# Patient Record
Sex: FEMALE | Race: BLACK OR AFRICAN AMERICAN | NOT HISPANIC OR LATINO | Employment: OTHER | URBAN - METROPOLITAN AREA
[De-identification: names, ages, dates, MRNs, and addresses within clinical notes are randomized per-mention and may not be internally consistent; named-entity substitution may affect disease eponyms.]

---

## 2020-08-12 PROBLEM — I10 HYPERTENSION: Status: ACTIVE | Noted: 2020-08-12

## 2020-12-11 PROBLEM — R55 SYNCOPAL EPISODES: Status: ACTIVE | Noted: 2020-12-11

## 2020-12-17 PROBLEM — E11.9 TYPE 2 DIABETES MELLITUS WITHOUT COMPLICATION, WITHOUT LONG-TERM CURRENT USE OF INSULIN: Status: ACTIVE | Noted: 2020-12-17

## 2021-01-28 PROBLEM — R53.1 LEFT-SIDED WEAKNESS: Status: ACTIVE | Noted: 2021-01-28

## 2021-02-08 ENCOUNTER — CLINICAL SUPPORT (OUTPATIENT)
Dept: SMOKING CESSATION | Facility: CLINIC | Age: 51
End: 2021-02-08
Payer: COMMERCIAL

## 2021-02-08 VITALS — TEMPERATURE: 97 F

## 2021-02-08 DIAGNOSIS — F17.200 NICOTINE DEPENDENCE: Primary | ICD-10-CM

## 2021-02-08 PROCEDURE — 99404 PR PREVENT COUNSEL,INDIV,60 MIN: ICD-10-PCS | Mod: S$GLB,,, | Performed by: GENERAL PRACTICE

## 2021-02-08 PROCEDURE — 99404 PREV MED CNSL INDIV APPRX 60: CPT | Mod: S$GLB,,, | Performed by: GENERAL PRACTICE

## 2021-02-08 RX ORDER — VARENICLINE TARTRATE 0.5 (11)-1
KIT ORAL
Qty: 1 PACKAGE | Refills: 0 | Status: SHIPPED | OUTPATIENT
Start: 2021-02-08 | End: 2021-03-02

## 2021-02-15 PROBLEM — Z12.11 COLON CANCER SCREENING: Status: ACTIVE | Noted: 2021-02-15

## 2021-02-22 ENCOUNTER — TELEPHONE (OUTPATIENT)
Dept: SMOKING CESSATION | Facility: CLINIC | Age: 51
End: 2021-02-22

## 2021-02-23 ENCOUNTER — CLINICAL SUPPORT (OUTPATIENT)
Dept: SMOKING CESSATION | Facility: CLINIC | Age: 51
End: 2021-02-23
Payer: COMMERCIAL

## 2021-02-23 ENCOUNTER — TELEPHONE (OUTPATIENT)
Dept: SMOKING CESSATION | Facility: CLINIC | Age: 51
End: 2021-02-23

## 2021-02-23 DIAGNOSIS — F17.200 NICOTINE DEPENDENCE: Primary | ICD-10-CM

## 2021-02-23 PROCEDURE — 99402 PREV MED CNSL INDIV APPRX 30: CPT | Mod: S$GLB,,, | Performed by: GENERAL PRACTICE

## 2021-02-23 PROCEDURE — 99402 PR PREVENT COUNSEL,INDIV,30 MIN: ICD-10-PCS | Mod: S$GLB,,, | Performed by: GENERAL PRACTICE

## 2021-03-02 ENCOUNTER — CLINICAL SUPPORT (OUTPATIENT)
Dept: SMOKING CESSATION | Facility: CLINIC | Age: 51
End: 2021-03-02
Payer: COMMERCIAL

## 2021-03-02 DIAGNOSIS — F17.200 NICOTINE DEPENDENCE: Primary | ICD-10-CM

## 2021-03-02 PROCEDURE — 99402 PR PREVENT COUNSEL,INDIV,30 MIN: ICD-10-PCS | Mod: S$GLB,,, | Performed by: GENERAL PRACTICE

## 2021-03-02 PROCEDURE — 99402 PREV MED CNSL INDIV APPRX 30: CPT | Mod: S$GLB,,, | Performed by: GENERAL PRACTICE

## 2021-03-02 RX ORDER — VARENICLINE TARTRATE 1 MG/1
1 TABLET, FILM COATED ORAL 2 TIMES DAILY
Qty: 58 TABLET | Refills: 0 | Status: SHIPPED | OUTPATIENT
Start: 2021-03-02 | End: 2021-04-01 | Stop reason: SDUPTHER

## 2021-03-09 ENCOUNTER — CLINICAL SUPPORT (OUTPATIENT)
Dept: SMOKING CESSATION | Facility: CLINIC | Age: 51
End: 2021-03-09
Payer: COMMERCIAL

## 2021-03-09 DIAGNOSIS — F17.200 NICOTINE DEPENDENCE: Primary | ICD-10-CM

## 2021-03-09 PROCEDURE — 99402 PREV MED CNSL INDIV APPRX 30: CPT | Mod: S$GLB,,, | Performed by: GENERAL PRACTICE

## 2021-03-09 PROCEDURE — 99402 PR PREVENT COUNSEL,INDIV,30 MIN: ICD-10-PCS | Mod: S$GLB,,, | Performed by: GENERAL PRACTICE

## 2021-03-16 ENCOUNTER — CLINICAL SUPPORT (OUTPATIENT)
Dept: SMOKING CESSATION | Facility: CLINIC | Age: 51
End: 2021-03-16
Payer: COMMERCIAL

## 2021-03-16 ENCOUNTER — TELEPHONE (OUTPATIENT)
Dept: SMOKING CESSATION | Facility: CLINIC | Age: 51
End: 2021-03-16

## 2021-03-16 DIAGNOSIS — F17.200 NICOTINE DEPENDENCE: Primary | ICD-10-CM

## 2021-03-16 PROCEDURE — 99402 PR PREVENT COUNSEL,INDIV,30 MIN: ICD-10-PCS | Mod: S$GLB,,, | Performed by: GENERAL PRACTICE

## 2021-03-16 PROCEDURE — 99402 PREV MED CNSL INDIV APPRX 30: CPT | Mod: S$GLB,,, | Performed by: GENERAL PRACTICE

## 2021-03-22 ENCOUNTER — TELEPHONE (OUTPATIENT)
Dept: SMOKING CESSATION | Facility: CLINIC | Age: 51
End: 2021-03-22

## 2021-03-24 ENCOUNTER — TELEPHONE (OUTPATIENT)
Dept: SMOKING CESSATION | Facility: CLINIC | Age: 51
End: 2021-03-24

## 2021-03-29 DIAGNOSIS — F17.200 NICOTINE DEPENDENCE: ICD-10-CM

## 2021-03-29 RX ORDER — VARENICLINE TARTRATE 1 MG/1
TABLET, FILM COATED ORAL
Qty: 56 TABLET | Refills: 1 | OUTPATIENT
Start: 2021-03-29

## 2021-04-01 ENCOUNTER — TELEPHONE (OUTPATIENT)
Dept: SMOKING CESSATION | Facility: CLINIC | Age: 51
End: 2021-04-01

## 2021-04-01 ENCOUNTER — CLINICAL SUPPORT (OUTPATIENT)
Dept: SMOKING CESSATION | Facility: CLINIC | Age: 51
End: 2021-04-01
Payer: COMMERCIAL

## 2021-04-01 DIAGNOSIS — F17.200 NICOTINE DEPENDENCE: Primary | ICD-10-CM

## 2021-04-01 DIAGNOSIS — F17.200 NICOTINE DEPENDENCE: ICD-10-CM

## 2021-04-01 PROCEDURE — 99402 PR PREVENT COUNSEL,INDIV,30 MIN: ICD-10-PCS | Mod: S$GLB,,, | Performed by: GENERAL PRACTICE

## 2021-04-01 PROCEDURE — 99402 PREV MED CNSL INDIV APPRX 30: CPT | Mod: S$GLB,,, | Performed by: GENERAL PRACTICE

## 2021-04-01 RX ORDER — VARENICLINE TARTRATE 1 MG/1
1 TABLET, FILM COATED ORAL 2 TIMES DAILY
Qty: 58 TABLET | Refills: 0 | Status: SHIPPED | OUTPATIENT
Start: 2021-04-01 | End: 2021-05-18 | Stop reason: SDUPTHER

## 2021-04-15 ENCOUNTER — CLINICAL SUPPORT (OUTPATIENT)
Dept: SMOKING CESSATION | Facility: CLINIC | Age: 51
End: 2021-04-15
Payer: COMMERCIAL

## 2021-04-15 DIAGNOSIS — F17.200 NICOTINE DEPENDENCE: Primary | ICD-10-CM

## 2021-04-15 PROCEDURE — 99401 PR PREVENT COUNSEL,INDIV,15 MIN: ICD-10-PCS | Mod: S$GLB,,, | Performed by: GENERAL PRACTICE

## 2021-04-15 PROCEDURE — 99401 PREV MED CNSL INDIV APPRX 15: CPT | Mod: S$GLB,,, | Performed by: GENERAL PRACTICE

## 2021-04-27 ENCOUNTER — CLINICAL SUPPORT (OUTPATIENT)
Dept: SMOKING CESSATION | Facility: CLINIC | Age: 51
End: 2021-04-27
Payer: COMMERCIAL

## 2021-04-27 DIAGNOSIS — F17.200 NICOTINE DEPENDENCE: Primary | ICD-10-CM

## 2021-04-27 PROCEDURE — 99402 PR PREVENT COUNSEL,INDIV,30 MIN: ICD-10-PCS | Mod: S$GLB,,, | Performed by: GENERAL PRACTICE

## 2021-04-27 PROCEDURE — 99402 PREV MED CNSL INDIV APPRX 30: CPT | Mod: S$GLB,,, | Performed by: GENERAL PRACTICE

## 2021-04-27 RX ORDER — MICONAZOLE NITRATE 2 %
2 CREAM (GRAM) TOPICAL
Qty: 120 EACH | Refills: 0 | Status: SHIPPED | OUTPATIENT
Start: 2021-04-27 | End: 2021-07-19

## 2021-04-29 PROBLEM — Z86.73 HISTORY OF ARTERIAL ISCHEMIC STROKE: Status: ACTIVE | Noted: 2021-04-29

## 2021-04-29 PROBLEM — R48.1: Status: ACTIVE | Noted: 2021-04-29

## 2021-04-29 PROBLEM — F80.9 LANGUAGE DIFFICULTY: Status: ACTIVE | Noted: 2021-04-29

## 2021-05-04 ENCOUNTER — CLINICAL SUPPORT (OUTPATIENT)
Dept: SMOKING CESSATION | Facility: CLINIC | Age: 51
End: 2021-05-04
Payer: COMMERCIAL

## 2021-05-04 DIAGNOSIS — F17.200 NICOTINE DEPENDENCE: Primary | ICD-10-CM

## 2021-05-04 PROCEDURE — 99402 PR PREVENT COUNSEL,INDIV,30 MIN: ICD-10-PCS | Mod: S$GLB,,, | Performed by: GENERAL PRACTICE

## 2021-05-04 PROCEDURE — 99402 PREV MED CNSL INDIV APPRX 30: CPT | Mod: S$GLB,,, | Performed by: GENERAL PRACTICE

## 2021-05-18 ENCOUNTER — CLINICAL SUPPORT (OUTPATIENT)
Dept: SMOKING CESSATION | Facility: CLINIC | Age: 51
End: 2021-05-18
Payer: COMMERCIAL

## 2021-05-18 DIAGNOSIS — F17.200 NICOTINE DEPENDENCE: ICD-10-CM

## 2021-05-18 PROCEDURE — 90853 PR GROUP PSYCHOTHERAPY: ICD-10-PCS | Mod: S$GLB,,, | Performed by: GENERAL PRACTICE

## 2021-05-18 PROCEDURE — 90853 GROUP PSYCHOTHERAPY: CPT | Mod: S$GLB,,, | Performed by: GENERAL PRACTICE

## 2021-05-18 RX ORDER — VARENICLINE TARTRATE 1 MG/1
1 TABLET, FILM COATED ORAL 2 TIMES DAILY
Qty: 56 TABLET | Refills: 0 | Status: SHIPPED | OUTPATIENT
Start: 2021-05-18 | End: 2021-06-24 | Stop reason: SDUPTHER

## 2021-06-02 ENCOUNTER — CLINICAL SUPPORT (OUTPATIENT)
Dept: SMOKING CESSATION | Facility: CLINIC | Age: 51
End: 2021-06-02
Payer: COMMERCIAL

## 2021-06-02 DIAGNOSIS — F17.200 NICOTINE DEPENDENCE: Primary | ICD-10-CM

## 2021-06-02 PROCEDURE — 90853 GROUP PSYCHOTHERAPY: CPT | Mod: S$GLB,,, | Performed by: GENERAL PRACTICE

## 2021-06-02 PROCEDURE — 90853 PR GROUP PSYCHOTHERAPY: ICD-10-PCS | Mod: S$GLB,,, | Performed by: GENERAL PRACTICE

## 2021-06-21 ENCOUNTER — TELEPHONE (OUTPATIENT)
Dept: SMOKING CESSATION | Facility: CLINIC | Age: 51
End: 2021-06-21

## 2021-06-24 ENCOUNTER — CLINICAL SUPPORT (OUTPATIENT)
Dept: SMOKING CESSATION | Facility: CLINIC | Age: 51
End: 2021-06-24
Payer: COMMERCIAL

## 2021-06-24 ENCOUNTER — TELEPHONE (OUTPATIENT)
Dept: SMOKING CESSATION | Facility: CLINIC | Age: 51
End: 2021-06-24

## 2021-06-24 DIAGNOSIS — F17.200 NICOTINE DEPENDENCE: Primary | ICD-10-CM

## 2021-06-24 PROCEDURE — 99402 PREV MED CNSL INDIV APPRX 30: CPT | Mod: S$GLB,,, | Performed by: GENERAL PRACTICE

## 2021-06-24 PROCEDURE — 99402 PR PREVENT COUNSEL,INDIV,30 MIN: ICD-10-PCS | Mod: S$GLB,,, | Performed by: GENERAL PRACTICE

## 2021-06-24 RX ORDER — VARENICLINE TARTRATE 1 MG/1
1 TABLET, FILM COATED ORAL 2 TIMES DAILY
Qty: 56 TABLET | Refills: 0 | Status: SHIPPED | OUTPATIENT
Start: 2021-06-24 | End: 2021-07-26 | Stop reason: SDUPTHER

## 2021-07-19 ENCOUNTER — TELEPHONE (OUTPATIENT)
Dept: SMOKING CESSATION | Facility: CLINIC | Age: 51
End: 2021-07-19

## 2021-07-19 ENCOUNTER — CLINICAL SUPPORT (OUTPATIENT)
Dept: SMOKING CESSATION | Facility: CLINIC | Age: 51
End: 2021-07-19
Payer: COMMERCIAL

## 2021-07-19 DIAGNOSIS — F17.200 NICOTINE DEPENDENCE: Primary | ICD-10-CM

## 2021-07-19 PROCEDURE — 99402 PREV MED CNSL INDIV APPRX 30: CPT | Mod: S$GLB,,, | Performed by: GENERAL PRACTICE

## 2021-07-19 PROCEDURE — 99402 PR PREVENT COUNSEL,INDIV,30 MIN: ICD-10-PCS | Mod: S$GLB,,, | Performed by: GENERAL PRACTICE

## 2021-07-21 RX ORDER — IBUPROFEN 200 MG
1 TABLET ORAL DAILY
Qty: 14 PATCH | Refills: 0 | Status: SHIPPED | OUTPATIENT
Start: 2021-07-21 | End: 2021-07-30 | Stop reason: CLARIF

## 2021-07-26 ENCOUNTER — TELEPHONE (OUTPATIENT)
Dept: SMOKING CESSATION | Facility: CLINIC | Age: 51
End: 2021-07-26

## 2021-07-26 DIAGNOSIS — F17.200 NICOTINE DEPENDENCE: ICD-10-CM

## 2021-07-26 RX ORDER — VARENICLINE TARTRATE 1 MG/1
1 TABLET, FILM COATED ORAL 2 TIMES DAILY
Qty: 56 TABLET | Refills: 0 | Status: SHIPPED | OUTPATIENT
Start: 2021-07-26 | End: 2022-01-31 | Stop reason: ALTCHOICE

## 2021-07-29 ENCOUNTER — TELEPHONE (OUTPATIENT)
Dept: SMOKING CESSATION | Facility: CLINIC | Age: 51
End: 2021-07-29

## 2021-08-23 ENCOUNTER — TELEPHONE (OUTPATIENT)
Dept: SMOKING CESSATION | Facility: CLINIC | Age: 51
End: 2021-08-23

## 2021-09-08 ENCOUNTER — CLINICAL SUPPORT (OUTPATIENT)
Dept: SMOKING CESSATION | Facility: CLINIC | Age: 51
End: 2021-09-08
Payer: COMMERCIAL

## 2021-09-08 DIAGNOSIS — F17.200 NICOTINE DEPENDENCE: Primary | ICD-10-CM

## 2021-09-08 PROCEDURE — 99407 BEHAV CHNG SMOKING > 10 MIN: CPT | Mod: S$GLB,,,

## 2021-09-08 PROCEDURE — 99407 PR TOBACCO USE CESSATION INTENSIVE >10 MINUTES: ICD-10-PCS | Mod: S$GLB,,,

## 2021-09-28 ENCOUNTER — TELEPHONE (OUTPATIENT)
Dept: SMOKING CESSATION | Facility: CLINIC | Age: 51
End: 2021-09-28

## 2021-11-19 ENCOUNTER — PATIENT MESSAGE (OUTPATIENT)
Dept: ADMINISTRATIVE | Facility: OTHER | Age: 51
End: 2021-11-19

## 2021-11-19 PROBLEM — E78.00 PURE HYPERCHOLESTEROLEMIA: Status: ACTIVE | Noted: 2021-11-19

## 2021-12-17 PROCEDURE — 99454 PR REMOTE MNTR, PHYS PARAM, INITIAL, EA 30 DAYS: ICD-10-PCS | Mod: S$GLB,,, | Performed by: INTERNAL MEDICINE

## 2021-12-17 PROCEDURE — 99454 REM MNTR PHYSIOL PARAM 16-30: CPT | Mod: S$GLB,,, | Performed by: INTERNAL MEDICINE

## 2022-01-02 ENCOUNTER — PATIENT MESSAGE (OUTPATIENT)
Dept: ADMINISTRATIVE | Facility: OTHER | Age: 52
End: 2022-01-02

## 2022-01-19 DIAGNOSIS — U07.1 COVID-19 VIRUS DETECTED: ICD-10-CM

## 2022-01-29 ENCOUNTER — NURSE TRIAGE (OUTPATIENT)
Dept: ADMINISTRATIVE | Facility: CLINIC | Age: 52
End: 2022-01-29

## 2022-01-29 NOTE — TELEPHONE ENCOUNTER
"[facility] pt with c/o chest pain on right side x10 days, and difficulty breathing.    Pt was advised to go to ED now per triage protocol.  Pt verbalized understanding.     Reason for Disposition   [1] Chest pain (or "angina") comes and goes AND [2] is happening more often (increasing in frequency) or getting worse (increasing in severity) (Exception: chest pains that last only a few seconds)    Additional Information   Negative: SEVERE difficulty breathing (e.g., struggling for each breath, speaks in single words)   Negative: Difficult to awaken or acting confused (e.g., disoriented, slurred speech)   Negative: Shock suspected (e.g., cold/pale/clammy skin, too weak to stand, low BP, rapid pulse)   Negative: Passed out (i.e., fainted, collapsed and was not responding)   Negative: [1] Chest pain lasts > 5 minutes AND [2] age > 44   Negative: [1] Chest pain lasts > 5 minutes AND [2] age > 30 AND [3] one or more cardiac risk factors (e.g., diabetes, high blood pressure, high cholesterol, smoker, or strong family history of heart disease)   Negative: [1] Chest pain lasts > 5 minutes AND [2] history of heart disease (i.e., angina, heart attack, heart failure, bypass surgery, takes nitroglycerin)   Negative: [1] Chest pain lasts > 5 minutes AND [2] described as crushing, pressure-like, or heavy   Negative: Heart beating < 50 beats per minute OR > 140 beats per minute   Negative: Visible sweat on face or sweat dripping down face   Negative: Sounds like a life-threatening emergency to the triager   Negative: SEVERE chest pain    Protocols used: CHEST PAIN-A-AH      "

## 2022-02-16 ENCOUNTER — PATIENT MESSAGE (OUTPATIENT)
Dept: OTHER | Facility: OTHER | Age: 52
End: 2022-02-16

## 2022-03-15 ENCOUNTER — PATIENT MESSAGE (OUTPATIENT)
Dept: OTHER | Facility: OTHER | Age: 52
End: 2022-03-15

## 2022-05-12 ENCOUNTER — TELEPHONE (OUTPATIENT)
Dept: SMOKING CESSATION | Facility: CLINIC | Age: 52
End: 2022-05-12
Payer: COMMERCIAL

## 2022-05-16 ENCOUNTER — CLINICAL SUPPORT (OUTPATIENT)
Dept: SMOKING CESSATION | Facility: CLINIC | Age: 52
End: 2022-05-16
Payer: COMMERCIAL

## 2022-05-16 DIAGNOSIS — F17.200 NICOTINE DEPENDENCE: Primary | ICD-10-CM

## 2022-05-16 PROCEDURE — 99404 PREV MED CNSL INDIV APPRX 60: CPT | Mod: S$GLB,,, | Performed by: GENERAL PRACTICE

## 2022-05-16 PROCEDURE — 99404 PR PREVENT COUNSEL,INDIV,60 MIN: ICD-10-PCS | Mod: S$GLB,,, | Performed by: GENERAL PRACTICE

## 2022-05-16 RX ORDER — DM/P-EPHED/ACETAMINOPH/DOXYLAM 30-7.5/3
2 LIQUID (ML) ORAL
Qty: 72 LOZENGE | Refills: 0 | Status: SHIPPED | OUTPATIENT
Start: 2022-05-16 | End: 2022-10-10 | Stop reason: ALTCHOICE

## 2022-05-16 RX ORDER — IBUPROFEN 200 MG
1 TABLET ORAL DAILY
Qty: 14 PATCH | Refills: 0 | Status: SHIPPED | OUTPATIENT
Start: 2022-05-16 | End: 2022-10-10 | Stop reason: ALTCHOICE

## 2022-05-16 NOTE — Clinical Note
Met with patient in clinic to conduct intake appointment. Patient will be participating in weekly tobacco cessation meetings and will begin the prescribed tobacco cessation medication 14 mg patches and 2mg lozenges (PRN). FTND of 5 indicates a high level of dependence to tobacco. ROSALINA-D of 33 is perceived as no mental distress/ depression.

## 2022-05-31 ENCOUNTER — CLINICAL SUPPORT (OUTPATIENT)
Dept: SMOKING CESSATION | Facility: CLINIC | Age: 52
End: 2022-05-31
Payer: COMMERCIAL

## 2022-05-31 DIAGNOSIS — F17.200 NICOTINE DEPENDENCE: Primary | ICD-10-CM

## 2022-05-31 PROCEDURE — 99404 PR PREVENT COUNSEL,INDIV,60 MIN: ICD-10-PCS | Mod: S$GLB,,, | Performed by: GENERAL PRACTICE

## 2022-05-31 PROCEDURE — 99404 PREV MED CNSL INDIV APPRX 60: CPT | Mod: S$GLB,,, | Performed by: GENERAL PRACTICE

## 2022-05-31 NOTE — Clinical Note
Met with patient at length regarding tobacco cessation progress. Patient was prescribed 14mg nicotine patches and 2mg lozenges. She continues to smoke up to 10 cigarettes a day. Patient informed me that she has not started the process of quitting. She stated that she was not ready and had to get her mind ready. I told her that I understood. Patient plans to start the process on Sunday 6/5/22. I encouraged her to write down the benefits or quitting and the benefits of staying a smoker. We discussed learned addiction model, cues/triggers, personal reasons for quitting, medications, goals. The patient will continue with  therapy sessions and medication monitoring by CTTS. Prescribed medication management will be by physician. The patient denies any abnormal behavioral or mental changes at this time.

## 2022-05-31 NOTE — PROGRESS NOTES
Individual Follow-Up Form    5/31/2022    Quit Date: none    Clinical Status of Patient: Outpatient    Length of Service: 60 minutes    Continuing Medication: no    Other Medications: none     Target Symptoms: Withdrawal and medication side effects. The following were rated moderate (3) to severe (4) on TCRS:  · Moderate (3): none  · Severe (4): none    Comments: Met with patient at length regarding tobacco cessation progress. Patient was prescribed 14mg nicotine patches and 2mg lozenges. She continues to smoke up to 10 cigarettes a day. Patient informed me that she has not started the process of quitting. She stated that she was not ready and had to get her mind ready. I told her that I understood. Patient plans to start the process on Sunday 6/5/22. I encouraged her to write down the benefits or quitting and the benefits of staying a smoker. We discussed learned addiction model, cues/triggers, personal reasons for quitting, medications, goals. The patient will continue with  therapy sessions and medication monitoring by CTTS. Prescribed medication management will be by physician. The patient denies any abnormal behavioral or mental changes at this time.       Diagnosis: F17.200    Next Visit: 2 weeks

## 2022-06-14 ENCOUNTER — TELEPHONE (OUTPATIENT)
Dept: SMOKING CESSATION | Facility: CLINIC | Age: 52
End: 2022-06-14

## 2022-06-15 ENCOUNTER — PATIENT MESSAGE (OUTPATIENT)
Dept: OTHER | Facility: OTHER | Age: 52
End: 2022-06-15
Payer: COMMERCIAL

## 2022-06-16 ENCOUNTER — TELEPHONE (OUTPATIENT)
Dept: SMOKING CESSATION | Facility: CLINIC | Age: 52
End: 2022-06-16
Payer: COMMERCIAL

## 2022-07-05 ENCOUNTER — PATIENT MESSAGE (OUTPATIENT)
Dept: OTHER | Facility: OTHER | Age: 52
End: 2022-07-05
Payer: COMMERCIAL

## 2022-11-09 ENCOUNTER — CLINICAL SUPPORT (OUTPATIENT)
Dept: SMOKING CESSATION | Facility: CLINIC | Age: 52
End: 2022-11-09
Payer: COMMERCIAL

## 2022-11-09 DIAGNOSIS — F17.200 NICOTINE DEPENDENCE: Primary | ICD-10-CM

## 2022-11-09 PROCEDURE — 99999 PR PBB SHADOW E&M-EST. PATIENT-LVL I: ICD-10-PCS | Mod: PBBFAC,,,

## 2022-11-09 PROCEDURE — 99407 BEHAV CHNG SMOKING > 10 MIN: CPT | Mod: S$GLB,,, | Performed by: GENERAL PRACTICE

## 2022-11-09 PROCEDURE — 99407 PR TOBACCO USE CESSATION INTENSIVE >10 MINUTES: ICD-10-PCS | Mod: S$GLB,,, | Performed by: GENERAL PRACTICE

## 2022-11-09 PROCEDURE — 99999 PR PBB SHADOW E&M-EST. PATIENT-LVL I: CPT | Mod: PBBFAC,,,

## 2022-11-09 NOTE — PROGRESS NOTES
Spoke with patient today in regard to smoking cessation progress 3 and 6 month telephone follow up, she states that she is currently not tobacco free.  Patient stated that she became tobacco free for a short period of time but returned to smoking soon after.  Commended patient on the accomplishments thus far.  Informed patient of benefit period, future follow up, and contact information if any further help or support is needed.  Will complete smart form for 12 month follow up on quit attempt #1 and resolve and 3/6 month follow up on Quit attempt #2

## 2023-02-06 PROBLEM — I69.354 HEMIPARESIS AFFECTING LEFT SIDE AS LATE EFFECT OF CEREBROVASCULAR ACCIDENT (CVA): Status: ACTIVE | Noted: 2023-02-06

## 2023-05-08 PROBLEM — E11.9 TYPE 2 DIABETES MELLITUS WITHOUT COMPLICATION, WITHOUT LONG-TERM CURRENT USE OF INSULIN: Status: RESOLVED | Noted: 2020-12-17 | Resolved: 2023-05-08

## 2023-05-16 ENCOUNTER — CLINICAL SUPPORT (OUTPATIENT)
Dept: SMOKING CESSATION | Facility: CLINIC | Age: 53
End: 2023-05-16
Payer: COMMERCIAL

## 2023-05-16 DIAGNOSIS — F17.200 NICOTINE DEPENDENCE, UNCOMPLICATED: Primary | ICD-10-CM

## 2023-05-16 PROCEDURE — 99999 PR PBB SHADOW E&M-EST. PATIENT-LVL I: ICD-10-PCS | Mod: PBBFAC,,,

## 2023-05-16 PROCEDURE — 99999 PR PBB SHADOW E&M-EST. PATIENT-LVL I: CPT | Mod: PBBFAC,,,

## 2023-05-16 PROCEDURE — 99407 BEHAV CHNG SMOKING > 10 MIN: CPT | Mod: S$GLB,,, | Performed by: GENERAL PRACTICE

## 2023-05-16 PROCEDURE — 99407 PR TOBACCO USE CESSATION INTENSIVE >10 MINUTES: ICD-10-PCS | Mod: S$GLB,,, | Performed by: GENERAL PRACTICE

## 2023-05-16 NOTE — PROGRESS NOTES
Spoke with patient today in regard to smoking cessation progress for 12 month follow up. She states she is not tobacco free. Patient has scheduled appointment on 6/1/2023 @ 1400 to return to the program for Quit attempt # 3. Informed patient of benefit period, future follow ups and contact information if any further help is needed. Will resolve smart form for 12 month follow up for Quit # 2.

## 2023-06-01 ENCOUNTER — CLINICAL SUPPORT (OUTPATIENT)
Dept: SMOKING CESSATION | Facility: CLINIC | Age: 53
End: 2023-06-01
Payer: COMMERCIAL

## 2023-06-01 DIAGNOSIS — F17.200 NICOTINE DEPENDENCE: Primary | ICD-10-CM

## 2023-06-01 PROCEDURE — 99404 PR PREVENT COUNSEL,INDIV,60 MIN: ICD-10-PCS | Mod: S$GLB,,, | Performed by: GENERAL PRACTICE

## 2023-06-01 PROCEDURE — 99404 PREV MED CNSL INDIV APPRX 60: CPT | Mod: S$GLB,,, | Performed by: GENERAL PRACTICE

## 2023-06-01 RX ORDER — VARENICLINE TARTRATE 1 MG/1
1 TABLET, FILM COATED ORAL 2 TIMES DAILY
Qty: 56 TABLET | Refills: 1 | Status: SHIPPED | OUTPATIENT
Start: 2023-06-01 | End: 2023-09-18

## 2023-06-01 NOTE — Clinical Note
Met with patient in clinic to conduct Quit Attempt 3 intake appointment. Patient will be participating in weekly tobacco cessation meetings and will begin the prescribed tobacco cessation medication Chantix (Varenicline) 1mg twice a day. FTND of 5 indicates a high level of dependence to tobacco. ROSALINA-D of 5 is perceived as no mental distress/ depression.

## 2023-06-14 ENCOUNTER — PATIENT MESSAGE (OUTPATIENT)
Dept: ADMINISTRATIVE | Facility: OTHER | Age: 53
End: 2023-06-14
Payer: MEDICARE

## 2023-06-15 ENCOUNTER — CLINICAL SUPPORT (OUTPATIENT)
Dept: SMOKING CESSATION | Facility: CLINIC | Age: 53
End: 2023-06-15
Payer: COMMERCIAL

## 2023-06-15 DIAGNOSIS — F17.200 NICOTINE DEPENDENCE: Primary | ICD-10-CM

## 2023-06-15 PROCEDURE — 99402 PR PREVENT COUNSEL,INDIV,30 MIN: ICD-10-PCS | Mod: S$GLB,,, | Performed by: GENERAL PRACTICE

## 2023-06-15 PROCEDURE — 99402 PREV MED CNSL INDIV APPRX 30: CPT | Mod: S$GLB,,, | Performed by: GENERAL PRACTICE

## 2023-06-15 NOTE — PROGRESS NOTES
Individual Follow-Up Form    6/15/2023    Quit Date: TBD    Clinical Status of Patient: Outpatient    Length of Service: 30 minutes    Continuing Medication: yes  Chantix    Other Medications: none     Target Symptoms: Withdrawal and medication side effects. The following were rated moderate (3) to severe (4) on TCRS:  Moderate (3): nausea  Severe (4): none    Comments: Spoke with patient at length via phone regarding tobacco cessation progress. Patient was prescribed Chantix 1mg twice a day. She continues to smoke up to 10 cigarettes a day. Patient informed me that she has been getting nauseated in the late afternoon once taking Chantix. I advised her to take the Chantix tomorrow and go the day without smoking. If she does not get sick we would know its from smoking while taking the medication. We discussed learned addiction model, cues/triggers, personal reasons for quitting, medications, goals. The patient will continue with  therapy sessions and medication monitoring by CTTS. Prescribed medication management will be by physician. The patient denies any abnormal behavioral or mental changes at this time.     Diagnosis: F17.200    Next Visit: 2 weeks

## 2023-06-15 NOTE — Clinical Note
Spoke with patient at length via phone regarding tobacco cessation progress. Patient was prescribed Chantix 1mg twice a day. She continues to smoke up to 10 cigarettes a day. Patient informed me that she has been getting nauseated in the late afternoon once taking Chantix. I advised her to take the Chantix tomorrow and go the day without smoking. If she does not get sick we would know its from smoking while taking the medication. We discussed learned addiction model, cues/triggers, personal reasons for quitting, medications, goals. The patient will continue with  therapy sessions and medication monitoring by CTTS. Prescribed medication management will be by physician. The patient denies any abnormal behavioral or mental changes at this time.

## 2023-07-17 ENCOUNTER — TELEPHONE (OUTPATIENT)
Dept: SMOKING CESSATION | Facility: CLINIC | Age: 53
End: 2023-07-17
Payer: MEDICARE

## 2023-07-17 NOTE — TELEPHONE ENCOUNTER
Called for follow-up telephonic visit with Smoking Cessation clinic. Pt did not answer, left message to return call.

## 2023-09-05 ENCOUNTER — TELEPHONE (OUTPATIENT)
Dept: SMOKING CESSATION | Facility: CLINIC | Age: 53
End: 2023-09-05
Payer: MEDICARE

## 2023-09-18 PROBLEM — R73.03 PREDIABETES: Status: ACTIVE | Noted: 2023-09-18

## 2023-09-21 ENCOUNTER — TELEPHONE (OUTPATIENT)
Dept: SMOKING CESSATION | Facility: CLINIC | Age: 53
End: 2023-09-21
Payer: MEDICARE

## 2023-09-21 NOTE — TELEPHONE ENCOUNTER
Called to follow up with patient regarding tobacco cessation progress. She stated that she fell off the wagon but needed to get back into the program. She has a stress test coming up soon. Quit 4 appt scheduled for 10/16/23.

## 2023-09-24 PROBLEM — Z91.89 AT INCREASED RISK FOR CARDIOVASCULAR DISEASE: Status: ACTIVE | Noted: 2023-09-24

## 2023-09-24 PROBLEM — Z82.49 FAMILY HISTORY OF HEART DISEASE: Status: ACTIVE | Noted: 2023-09-24

## 2023-09-24 PROBLEM — R07.9 CHEST PAIN: Status: ACTIVE | Noted: 2023-09-24

## 2023-10-02 ENCOUNTER — TELEPHONE (OUTPATIENT)
Dept: SMOKING CESSATION | Facility: CLINIC | Age: 53
End: 2023-10-02
Payer: MEDICARE

## 2023-11-08 ENCOUNTER — TELEPHONE (OUTPATIENT)
Dept: SMOKING CESSATION | Facility: CLINIC | Age: 53
End: 2023-11-08
Payer: MEDICARE

## 2023-11-08 NOTE — TELEPHONE ENCOUNTER
Called patient to confirm clinic intake Q4 appt for tobacco cessation. Patient answered and confirmed.

## 2023-11-09 ENCOUNTER — CLINICAL SUPPORT (OUTPATIENT)
Dept: SMOKING CESSATION | Facility: CLINIC | Age: 53
End: 2023-11-09
Payer: COMMERCIAL

## 2023-11-09 DIAGNOSIS — F17.200 NICOTINE DEPENDENCE: Primary | ICD-10-CM

## 2023-11-09 PROCEDURE — 99404 PREV MED CNSL INDIV APPRX 60: CPT | Mod: S$GLB,,, | Performed by: GENERAL PRACTICE

## 2023-11-09 PROCEDURE — 99404 PR PREVENT COUNSEL,INDIV,60 MIN: ICD-10-PCS | Mod: S$GLB,,, | Performed by: GENERAL PRACTICE

## 2023-11-09 RX ORDER — VARENICLINE TARTRATE 1 MG/1
1 TABLET, FILM COATED ORAL 2 TIMES DAILY
Qty: 56 TABLET | Refills: 0 | Status: SHIPPED | OUTPATIENT
Start: 2023-11-09 | End: 2023-12-18 | Stop reason: SDUPTHER

## 2023-11-09 NOTE — PROGRESS NOTES
Met with patient in clinic to conduct Quit Attempt 4 intake appointment. Patient will be participating in weekly tobacco cessation meetings and will begin the prescribed tobacco cessation medication Chantix. FTND of 7 indicates a high level of dependence to tobacco. ROSALINA-D of 1 is perceived as no mental distress/ depression.

## 2023-11-20 ENCOUNTER — CLINICAL SUPPORT (OUTPATIENT)
Dept: SMOKING CESSATION | Facility: CLINIC | Age: 53
End: 2023-11-20
Payer: COMMERCIAL

## 2023-11-20 DIAGNOSIS — F17.200 NICOTINE DEPENDENCE: Primary | ICD-10-CM

## 2023-11-20 PROCEDURE — 99402 PREV MED CNSL INDIV APPRX 30: CPT | Mod: S$GLB,,, | Performed by: GENERAL PRACTICE

## 2023-11-20 PROCEDURE — 99402 PR PREVENT COUNSEL,INDIV,30 MIN: ICD-10-PCS | Mod: S$GLB,,, | Performed by: GENERAL PRACTICE

## 2023-11-20 NOTE — Clinical Note
Spoke with patient at length via phone regarding tobacco cessation progress. The patient remains on the prescribed tobacco cessation medication regimen of 1 mg Chantix twice a day without any negative side effects at this time. Patient has decreased from 10 to 3 (halves) cigarettes a day. I commended her on cutting down. Discussed the harmfulness of re-lighting the cigarettes. Patient currently smokes inside. Patient triggers are morning, afternoon and before bed. Goal for this week is to smoke outside only. Patient wants to quit because she had a stroke and CHF and stints. Discussed learned addiction model, cues/triggers, personal reasons for quitting, medications, goals, quit date. The patient will continue with individual therapy sessions and medication monitoring by CTTS. Prescribed medication management will be by physician. The patient denies any abnormal behavioral or mental changes at this time.

## 2023-11-20 NOTE — PROGRESS NOTES
Individual Follow-Up Form    11/20/2023    Quit Date: TBD    Clinical Status of Patient: Outpatient    Length of Service: 30 minutes    Continuing Medication: yes  Chantix    Other Medications: none     Target Symptoms: Withdrawal and medication side effects. The following were  rated moderate (3) to severe (4) on TCRS:  Moderate (3): none  Severe (4): none    Comments: Spoke with patient at length via phone regarding tobacco cessation progress. The patient remains on the prescribed tobacco cessation medication regimen of 1 mg Chantix twice a day without any negative side effects at this time. Patient has decreased from 10 to 3 (halves) cigarettes a day. I commended her on cutting down. Discussed the harmfulness of re-lighting the cigarettes. Patient currently smokes inside. Patient triggers are morning, afternoon and before bed. Goal for this week is to smoke outside only. Patient wants to quit because she had a stroke and CHF and stints. Discussed learned addiction model, cues/triggers, personal reasons for quitting, medications, goals, quit date. The patient will continue with individual therapy sessions and medication monitoring by CTTS. Prescribed medication management will be by physician. The patient denies any abnormal behavioral or mental changes at this time.     Diagnosis: F17.200    Next Visit: 2 weeks

## 2023-12-04 ENCOUNTER — CLINICAL SUPPORT (OUTPATIENT)
Dept: SMOKING CESSATION | Facility: CLINIC | Age: 53
End: 2023-12-04
Payer: COMMERCIAL

## 2023-12-04 DIAGNOSIS — F17.200 NICOTINE DEPENDENCE: Primary | ICD-10-CM

## 2023-12-04 PROCEDURE — 99402 PR PREVENT COUNSEL,INDIV,30 MIN: ICD-10-PCS | Mod: S$GLB,,, | Performed by: GENERAL PRACTICE

## 2023-12-04 PROCEDURE — 99402 PREV MED CNSL INDIV APPRX 30: CPT | Mod: S$GLB,,, | Performed by: GENERAL PRACTICE

## 2023-12-04 NOTE — PROGRESS NOTES
Individual Follow-Up Form    12/4/2023    Quit Date: 11/24/23    Clinical Status of Patient: Outpatient    Length of Service: 30 minutes    Continuing Medication: yes  Chantix    Other Medications: none     Target Symptoms: Withdrawal and medication side effects. The following were rated moderate (3) to severe (4) on TCRS:  Moderate (3): none  Severe (4): none    Comments:  Spoke with patient at length via phone regarding tobacco cessation progress. The patient remains on the prescribed tobacco cessation medication regimen of 1 mg Chantix twice a day without any negative side effects at this time. No refill needed at this time. Patient stated that she quit smoking her quit date is 11/24/23. She stated that she was more determined than ever this time due to the health of her heart. Patient says she does have headaches and a little bit of nausea. Reviewed strategies, cues, and triggers. Introduced the negative impact of tobacco on health, the health advantages of discontinuing the use of tobacco, withdrawal issues to expect from nicotine and habit, and ways to stay quit. The patient will continue with individual therapy sessions and medication monitoring by CTTS. Prescribed medication management will be by physician. The patient denies any abnormal behavioral or mental changes at this time.     Diagnosis: F17.200    Next Visit: 2 weeks

## 2023-12-04 NOTE — Clinical Note
Spoke with patient at length via phone regarding tobacco cessation progress. The patient remains on the prescribed tobacco cessation medication regimen of 1 mg Chantix twice a day without any negative side effects at this time. Patient stated that she quit smoking her quit date is 11/24/23. She stated that she was more determined than ever this time due to the health of her heart. Patient says she does have headaches and a little bit of nausea. Reviewed strategies, cues, and triggers. Introduced the negative impact of tobacco on health, the health advantages of discontinuing the use of tobacco, withdrawal issues to expect from nicotine and habit, and ways to stay quit. The patient will continue with individual therapy sessions and medication monitoring by CTTS. Prescribed medication management will be by physician. The patient denies any abnormal behavioral or mental changes at this time.

## 2023-12-11 ENCOUNTER — PATIENT OUTREACH (OUTPATIENT)
Dept: ADMINISTRATIVE | Facility: OTHER | Age: 53
End: 2023-12-11
Payer: MEDICARE

## 2023-12-11 NOTE — PROGRESS NOTES
CHW - Initial Contact    This Community Health Worker completed the Social Determinant of Health questionnaire with patient during clinic visit today.    Pt identified barriers of most importance are: patient identified barrier of importance stress related to children.   Referrals to community agencies completed with patient consent outside of [facility] include: No community referral needed during   Clinic visit   Referrals were put through [facility] - no:   Support and Services:No support services needed during clinic visit    Other information discussed the patient needs help with: patient discussed no other information during clinic visit    Follow up required: Yes  Follow-up Outreach - Due: 12/25/2023

## 2023-12-14 ENCOUNTER — EPISODE CHANGES (OUTPATIENT)
Dept: SMOKING CESSATION | Facility: CLINIC | Age: 53
End: 2023-12-14

## 2023-12-18 ENCOUNTER — CLINICAL SUPPORT (OUTPATIENT)
Dept: SMOKING CESSATION | Facility: CLINIC | Age: 53
End: 2023-12-18
Payer: COMMERCIAL

## 2023-12-18 DIAGNOSIS — F17.200 NICOTINE DEPENDENCE: Primary | ICD-10-CM

## 2023-12-18 PROCEDURE — 99402 PR PREVENT COUNSEL,INDIV,30 MIN: ICD-10-PCS | Mod: S$GLB,,, | Performed by: GENERAL PRACTICE

## 2023-12-18 PROCEDURE — 99402 PREV MED CNSL INDIV APPRX 30: CPT | Mod: S$GLB,,, | Performed by: GENERAL PRACTICE

## 2023-12-18 RX ORDER — VARENICLINE TARTRATE 1 MG/1
1 TABLET, FILM COATED ORAL 2 TIMES DAILY
Qty: 56 TABLET | Refills: 0 | Status: SHIPPED | OUTPATIENT
Start: 2023-12-18

## 2023-12-18 NOTE — PROGRESS NOTES
Individual Follow-Up Form    12/18/2023    Quit Date: 11/24/23    Clinical Status of Patient: Outpatient    Length of Service: 30 minutes    Continuing Medication: yes  Chantix    Other Medications: none     Target Symptoms: Withdrawal and medication side effects. The following were rated moderate (3) to severe (4) on TCRS:  Moderate (3): none  Severe (4): none    Comments: Spoke with patient at length via phone regarding tobacco cessation progress. The patient remains on the prescribed tobacco cessation medication regimen of 1 mg Chantix twice a day without any negative side effects at this time. Refilled Chantix. Patient stated that she remains quit as of 11/24/23. Patient stated that she has the urges every once in a while but her biggest is at night after she gets done snacking because she would normally have something to snack and then smoke in bed. She has no concern about gaining weight due the fact that she lost some weight a couple months ago. Patient stated that benefits of quitting is saving money, feeling better, and helping her heart be healthier. The patient will continue with individual therapy sessions and medication monitoring by CTTS. Prescribed medication management will be by physician. The patient denies any abnormal behavioral or mental changes at this time.      Diagnosis: F17.200    Next Visit: 2 weeks

## 2023-12-18 NOTE — Clinical Note
Spoke with patient at length via phone regarding tobacco cessation progress. The patient remains on the prescribed tobacco cessation medication regimen of 1 mg Chantix twice a day without any negative side effects at this time. Refilled Chantix. Patient stated that she remains quit as of 11/24/23. Patient stated that she has the urges every once in a while but her biggest is at night after she gets done snacking because she would normally have something to snack and then smoke in bed. She has no concern about gaining weight due the fact that she lost some weight a couple months ago. Patient stated that benefits of quitting is saving money, feeling better, and helping her heart be healthier. The patient will continue with individual therapy sessions and medication monitoring by CTTS. Prescribed medication management will be by physician. The patient denies any abnormal behavioral or mental changes at this time.

## 2024-01-02 ENCOUNTER — PATIENT OUTREACH (OUTPATIENT)
Dept: ADMINISTRATIVE | Facility: OTHER | Age: 54
End: 2024-01-02
Payer: MEDICARE

## 2024-01-02 NOTE — PROGRESS NOTES
CHW - Follow Up    This Community Health Worker completed a follow up visit with patient via telephone today.  Pt reported: patient reported she is doing good no assistance is needed during phone interview.   Patient will reach out if  assistance is needed in the future. Reminded of upcoming appointment with provider.   Community Health Worker provided:CHW spoke with patient she is doing good.   Follow up required: No  No future outreach task assigned             CHW - Case Closure    This Community Health Worker spoke to patient via telephone today.   Pt reported: patient reported she is doing good no assistance is needed during phone interview.   Patient will reach out if  assistance is needed in the future. Reminded of upcoming appointment with provider.   Pt denied any additional needs at this time and agrees with episode closure at this time.    Provided patient with Community Health Worker's contact information and encouraged   her to contact this Community Health Worker if additional needs arise.

## 2024-01-10 DIAGNOSIS — U07.1 COVID-19 VIRUS DETECTED: ICD-10-CM

## 2024-01-11 ENCOUNTER — PATIENT MESSAGE (OUTPATIENT)
Dept: RESEARCH | Facility: HOSPITAL | Age: 54
End: 2024-01-11
Payer: MEDICARE

## 2024-01-16 ENCOUNTER — CLINICAL SUPPORT (OUTPATIENT)
Dept: SMOKING CESSATION | Facility: CLINIC | Age: 54
End: 2024-01-16
Payer: COMMERCIAL

## 2024-01-16 DIAGNOSIS — F17.200 NICOTINE DEPENDENCE: Primary | ICD-10-CM

## 2024-01-16 PROCEDURE — 99402 PREV MED CNSL INDIV APPRX 30: CPT | Mod: S$GLB,,, | Performed by: GENERAL PRACTICE

## 2024-01-16 NOTE — PROGRESS NOTES
Individual Follow-Up Form    1/16/2024    Quit Date: 11/24/23    Clinical Status of Patient: Outpatient    Length of Service: 30 minutes    Continuing Medication: yes  Chantix    Other Medications: none     Target Symptoms: Withdrawal and medication side effects. The following were  rated moderate (3) to severe (4) on TCRS:  Moderate (3): none  Severe (4): none    Comments: Spoke with patient at length via phone regarding tobacco cessation progress. The patient remains on the prescribed tobacco cessation medication regimen of 1 mg Chantix once a day without any negative side effects at this time. No refill needed. Patient stated that she remains quit as of 11/24/23. Patient stated that she does not have urges like before. She mentioned that she recently had COVID but has not gotten over it. have something to snack and then smoke in bed. Patient says that she is overall good and not concern or issues. The patient will continue with individual therapy sessions and medication monitoring by CTTS. Prescribed medication management will be by physician. The patient denies any abnormal behavioral or mental changes at this time.       Diagnosis: F17.200    Next Visit: 2 weeks

## 2024-01-16 NOTE — Clinical Note
poke with patient at length via phone regarding tobacco cessation progress. The patient remains on the prescribed tobacco cessation medication regimen of 1 mg Chantix once a day without any negative side effects at this time. No refill needed. Patient stated that she remains quit as of 11/24/23. Patient stated that she does not have urges like before. She mentioned that she recently had COVID but has not gotten over it. have something to snack and then smoke in bed. Patient says that she is overall good and not concern or issues. The patient will continue with individual therapy sessions and medication monitoring by CTTS. Prescribed medication management will be by physician. The patient denies any abnormal behavioral or mental changes at this time.

## 2024-02-12 ENCOUNTER — TELEPHONE (OUTPATIENT)
Dept: SMOKING CESSATION | Facility: CLINIC | Age: 54
End: 2024-02-12
Payer: MEDICARE

## 2024-02-27 DIAGNOSIS — Z00.00 ENCOUNTER FOR MEDICARE ANNUAL WELLNESS EXAM: ICD-10-CM

## 2024-04-09 ENCOUNTER — PATIENT OUTREACH (OUTPATIENT)
Dept: ADMINISTRATIVE | Facility: OTHER | Age: 54
End: 2024-04-09
Payer: MEDICARE

## 2024-04-09 NOTE — PROGRESS NOTES
CHW - Initial Contact    This Community Health Worker completed the Social Determinant of Health questionnaire with patient during clinic visit today.    Pt identified barriers of most importance are: patient identified no barriers of importance during clinic visit   Referrals to community agencies completed with patient consent outside of [facility] include: No community referral needed during clinic visit  Referrals were put through [facility] - no:   Support and Services: No support services needed during clinic visit  Other information discussed the patient needs  help with: patient discussed no other information during clinic visit    Follow up required: Yes  Follow-up Outreach - Due: 4/23/2024

## 2024-04-29 ENCOUNTER — PATIENT OUTREACH (OUTPATIENT)
Dept: ADMINISTRATIVE | Facility: OTHER | Age: 54
End: 2024-04-29
Payer: MEDICARE

## 2024-04-29 NOTE — PROGRESS NOTES
CHW - Follow Up    This Community Health Worker completed a follow up visit with patient via telephone today.  Pt reported: patient reported she is doing alright and no assistance is needed during follow up phone interview.  Patient will reach out if assistance is needed in the future. Patient was also reminded of upcoming appointments   Community Health Worker provided: CHW spoke with patient she is doing alright   Follow up required: No  No future outreach task assigned                     CHW - Case Closure    This Community Health Worker spoke to patient via telephone today.   Pt reported:  patient reported she is doing alright and no assistance is needed during follow up phone interview.  Patient will reach out if assistance is needed in the future. Patient was also reminded of upcoming appointments   Pt denied any additional needs at this time and agrees with episode closure at this time.    Provided patient with Community Health Worker's contact information and encouraged   her to contact this Community Health Worker if additional needs arise.

## 2024-04-30 DIAGNOSIS — F17.200 NICOTINE DEPENDENCE: ICD-10-CM

## 2024-05-02 RX ORDER — VARENICLINE TARTRATE 1 MG/1
1 TABLET, FILM COATED ORAL 2 TIMES DAILY
Qty: 56 TABLET | Refills: 0 | Status: SHIPPED | OUTPATIENT
Start: 2024-05-02 | End: 2024-05-24

## 2024-05-20 ENCOUNTER — CLINICAL SUPPORT (OUTPATIENT)
Dept: SMOKING CESSATION | Facility: CLINIC | Age: 54
End: 2024-05-20
Payer: COMMERCIAL

## 2024-05-20 DIAGNOSIS — F17.200 NICOTINE DEPENDENCE: Primary | ICD-10-CM

## 2024-05-20 PROCEDURE — 99407 BEHAV CHNG SMOKING > 10 MIN: CPT | Mod: S$GLB,,,

## 2024-05-20 NOTE — PROGRESS NOTES
Spoke with patient today in regard to smoking cessation progress for 6 month telephone follow up, she states not tobacco free, but only smoking a couple cigarettes a day.  Patient states not interested in returning to the program at this time.  Informed patient of benefit period and contact information if any further help or support is needed.  Will complete smart form for 3/6 month follow up on Quit attempt #4.

## 2024-05-22 DIAGNOSIS — F17.200 NICOTINE DEPENDENCE: ICD-10-CM

## 2024-05-24 RX ORDER — VARENICLINE TARTRATE 1 MG/1
1 TABLET, FILM COATED ORAL 2 TIMES DAILY
Qty: 180 TABLET | Refills: 0 | Status: SHIPPED | OUTPATIENT
Start: 2024-05-24 | End: 2024-08-22

## 2024-08-24 DIAGNOSIS — F17.200 NICOTINE DEPENDENCE: ICD-10-CM

## 2024-08-27 RX ORDER — VARENICLINE TARTRATE 1 MG/1
1 TABLET, FILM COATED ORAL 2 TIMES DAILY
Qty: 180 TABLET | Refills: 0 | Status: SHIPPED | OUTPATIENT
Start: 2024-08-27

## 2024-11-27 ENCOUNTER — TELEPHONE (OUTPATIENT)
Dept: SMOKING CESSATION | Facility: CLINIC | Age: 54
End: 2024-11-27
Payer: COMMERCIAL

## 2024-11-27 NOTE — TELEPHONE ENCOUNTER
Called patient about 12 month follow up. Left message for patient to call [phone]. Resolved quit episode.